# Patient Record
Sex: FEMALE | Race: WHITE | Employment: UNEMPLOYED | URBAN - METROPOLITAN AREA
[De-identification: names, ages, dates, MRNs, and addresses within clinical notes are randomized per-mention and may not be internally consistent; named-entity substitution may affect disease eponyms.]

---

## 2022-02-28 ENCOUNTER — TELEPHONE (OUTPATIENT)
Dept: OBGYN CLINIC | Age: 27
End: 2022-02-28

## 2022-02-28 DIAGNOSIS — Z34.92 ENCOUNTER FOR SUPERVISION OF NORMAL PREGNANCY IN SECOND TRIMESTER, UNSPECIFIED GRAVIDITY: Primary | ICD-10-CM

## 2022-02-28 NOTE — TELEPHONE ENCOUNTER
Hiwot from [address] [place] [address] calling needing orders placed for this patient. Submitted orders.

## 2022-02-28 NOTE — TELEPHONE ENCOUNTER
Call received at 655am    32year old patient to be seen in the office as ob transfer from [facility]     Patient reports edc of 5/27/2022 and that she is A - and needs rhogam prior to her first visit as new ob transfer on 3/15/2022. Patient is 26w6d pregnant     Patient has requested her records ( [place] ) and they are telling her that it will take 30 days. Patient is wondering how to proceed    ?  Lab visit only to check antibody screen and CBC      Please advise how to proceed

## 2022-02-28 NOTE — TELEPHONE ENCOUNTER
Yep. Have her come in for a CBC, Type and RH and ABS. She could also do her Glucola that day too, or wait until her appt for the Glucola. Should be fine to wait until 3/15 for her Rhogam as we typically do it around 28-29  wks.    Toshia Herrmann

## 2022-02-28 NOTE — TELEPHONE ENCOUNTER
Patient advised of CNM recommendations and verbalized understanding        Patient will wait for the Glucola testing    Patient was placed on the schedule to be seen today at 2:00PM for lab only visit    Patient verbalized understanding.

## 2022-03-01 PROBLEM — Z34.90 PREGNANCY: Status: ACTIVE | Noted: 2022-03-01

## 2022-03-01 LAB
ABO GROUP BLD: NORMAL
BASOPHILS # BLD AUTO: 0 X10E3/UL (ref 0–0.2)
BASOPHILS NFR BLD AUTO: 0 %
EOSINOPHIL # BLD AUTO: 0.1 X10E3/UL (ref 0–0.4)
EOSINOPHIL NFR BLD AUTO: 1 %
ERYTHROCYTE [DISTWIDTH] IN BLOOD BY AUTOMATED COUNT: 12.3 % (ref 11.7–15.4)
HCT VFR BLD AUTO: 36.3 % (ref 34–46.6)
HGB BLD-MCNC: 12 G/DL (ref 11.1–15.9)
IMM GRANULOCYTES # BLD AUTO: 0.1 X10E3/UL (ref 0–0.1)
IMM GRANULOCYTES NFR BLD AUTO: 1 %
LYMPHOCYTES # BLD AUTO: 2.4 X10E3/UL (ref 0.7–3.1)
LYMPHOCYTES NFR BLD AUTO: 17 %
MCH RBC QN AUTO: 29.3 PG (ref 26.6–33)
MCHC RBC AUTO-ENTMCNC: 33.1 G/DL (ref 31.5–35.7)
MCV RBC AUTO: 89 FL (ref 79–97)
MONOCYTES # BLD AUTO: 0.9 X10E3/UL (ref 0.1–0.9)
MONOCYTES NFR BLD AUTO: 7 %
NEUTROPHILS # BLD AUTO: 10.4 X10E3/UL (ref 1.4–7)
NEUTROPHILS NFR BLD AUTO: 74 %
PLATELET # BLD AUTO: 300 X10E3/UL (ref 150–450)
RBC # BLD AUTO: 4.1 X10E6/UL (ref 3.77–5.28)
RH BLD: NEGATIVE
WBC # BLD AUTO: 13.9 X10E3/UL (ref 3.4–10.8)

## 2022-03-15 ENCOUNTER — INITIAL PRENATAL (OUTPATIENT)
Dept: OBGYN CLINIC | Age: 27
End: 2022-03-15

## 2022-03-15 VITALS
DIASTOLIC BLOOD PRESSURE: 64 MMHG | HEIGHT: 65 IN | WEIGHT: 183 LBS | BODY MASS INDEX: 30.49 KG/M2 | SYSTOLIC BLOOD PRESSURE: 120 MMHG

## 2022-03-15 DIAGNOSIS — O26.893 RH NEGATIVE STATUS DURING PREGNANCY IN THIRD TRIMESTER: Primary | ICD-10-CM

## 2022-03-15 DIAGNOSIS — Z67.91 RH NEGATIVE STATUS DURING PREGNANCY IN THIRD TRIMESTER: Primary | ICD-10-CM

## 2022-03-15 DIAGNOSIS — Z29.13 NEED FOR RHOGAM DUE TO RH NEGATIVE MOTHER: ICD-10-CM

## 2022-03-15 DIAGNOSIS — Z34.90 ENCOUNTER FOR SUPERVISION OF NORMAL PREGNANCY, ANTEPARTUM, UNSPECIFIED GRAVIDITY: ICD-10-CM

## 2022-03-15 LAB
ANTIBODY SCREEN, EXTERNAL: NEGATIVE
BLOOD BANK CMNT PATIENT-IMP: NORMAL
BLOOD GROUP ANTIBODIES SERPL: NORMAL
COMMENT, HOLDF: NORMAL
HIV, EXTERNAL: NORMAL
SAMPLES BEING HELD,HOLD: NORMAL
T. PALLIDUM, EXTERNAL: NORMAL

## 2022-03-15 PROCEDURE — 0502F SUBSEQUENT PRENATAL CARE: CPT | Performed by: ADVANCED PRACTICE MIDWIFE

## 2022-03-15 PROCEDURE — 96372 THER/PROPH/DIAG INJ SC/IM: CPT | Performed by: ADVANCED PRACTICE MIDWIFE

## 2022-03-15 PROCEDURE — 90384 RH IG FULL-DOSE IM: CPT | Performed by: ADVANCED PRACTICE MIDWIFE

## 2022-03-15 RX ORDER — HUMAN RHO(D) IMMUNE GLOBULIN 300 UG/1
300 INJECTION, SOLUTION INTRAMUSCULAR ONCE
Qty: 1 EACH | Refills: 0
Start: 2022-03-15 | End: 2022-03-15

## 2022-03-15 NOTE — PROGRESS NOTES
After obtaining consent, and per orders of Everett MARTINEZ, injection of Rhogam Ultra-filtered Plus given in right gluteus by Jose D Varela LPN. Patient instructed to remain in clinic for 20 minutes afterwards, and to report any adverse reaction to me immediately. Lot: [id number] Exp: 12/12/2022 NDC: [id number].

## 2022-03-15 NOTE — PATIENT INSTRUCTIONS
Weeks 26 to 30 of Your Pregnancy: Care Instructions  Overview     You are now entering your last trimester of pregnancy. Your baby is growing quickly. Melva Laci probably feel your baby moving around more often. Your doctor may ask you to count your baby's kicks. Your back may ache as your body gets used to your baby's size and length. If you haven't already had the Tdap shot during this pregnancy, talk to your doctor about getting it. It will help protect your  against pertussis infection. During this time, it's important to take care of yourself and pay attention to what your body needs. If you feel sexual, you can explore ways to be close with your partner that match your comfort and desire. Follow-up care is a key part of your treatment and safety. Be sure to make and go to all appointments, and call your doctor if you are having problems. It's also a good idea to know your test results and keep a list of the medicines you take. How can you care for yourself at home? Take it easy at work  · Take frequent breaks. If possible, stop working when you are tired, and rest during your lunch hour. · Take bathroom breaks every 2 hours. · Change positions often. If you sit for long periods, stand up and walk around. · When you stand for a long time, keep one foot on a low stool with your knee bent. After standing a lot, sit with your feet up. · Avoid fumes, chemicals, and tobacco smoke. Be sexual in your own way  · Having sex during pregnancy is okay, unless your doctor tells you not to. · You may be very interested in sex, or you may have no interest at all. · Your growing belly can make it hard to find a good position during intercourse. [place] and explore. · You may get cramps in your uterus when your partner touches your breasts. · A back rub may relieve the backache or cramps that sometimes follow orgasm. Learn about  labor  · Watch for signs of  labor.  You may be going into labor if:  ? You have menstrual-like cramps, with or without nausea. ? You have about 6 or more contractions in 1 hour, even after you have had a glass of water and are resting. ? You have a low, dull backache that does not go away when you change your position. ? You have pain or pressure in your pelvis that comes and goes in a pattern. ? You have intestinal cramping or flu-like symptoms, with or without diarrhea.  ? You notice an increase or change in your vaginal discharge. Discharge may be heavy, mucus-like, watery, or streaked with blood. ? Your water breaks. · If you think you have  labor:  ? Drink 2 or 3 glasses of water or juice. Not drinking enough fluids can cause contractions. ? Stop what you are doing, and empty your bladder. Then lie down on your left side for at least 1 hour. ? While lying on your side, find your breast bone. Put your fingers in the soft spot just below it. Move your fingers down toward your belly button to find the top of your uterus. Check to see if it is tight. ? Contractions can be weak or strong. Record your contractions for an hour. Time a contraction from the start of one contraction to the start of the next one.  ? Single or several strong contractions without a pattern are called Hays-Chun contractions. They are practice contractions but not the start of labor. They often stop if you change what you are doing. ? Call your doctor if you have regular contractions. Where can you learn more? Go to http://www.gray.com/  Enter K794 in the search box to learn more about \"Weeks 26 to 30 of Your Pregnancy: Care Instructions. \"  Current as of: 2021               Content Version: 13.2  © 9778-1355 [employer]. Care instructions adapted under license by [employer] (which disclaims liability or warranty for this information).  If you have questions about a medical condition or this instruction, always ask your healthcare professional. [address] any warranty or liability for your use of this information. Learning About Rh Immunoglobulin Shots  Introduction     An Rh immunoglobulin shot is given to pregnant women who have Rh-negative blood. You may have Rh-negative blood, and your baby may have Rh-positive blood. If the two types of blood mix, your body will make antibodies. This is called Rh sensitization. Most of the time, this is not a problem the first time you're pregnant. But it could cause problems in future pregnancies. This shot keeps your body from making the antibodies. You get the shot around 28 weeks of pregnancy. After the birth, your baby's blood is tested. If the blood is Rh positive, you will get another shot. You may also get the shot if you have vaginal bleeding while you are pregnant or if you have a miscarriage. These shots protect future pregnancies. Women with Rh negative blood will need this shot each time they get pregnant. Example  · Rh immunoglobulin (HypRho-D, MICRhoGAM, and RhoGAM)  Possible side effects  Rare side effects may include:  · Some mild pain where you got the shot. · A slight fever. · An allergic reaction. You may have other side effects not listed here. Check the information that comes with your medicine. What to know about taking this medicine  · You may need more than one shot. You may need the shot again:  ? After amniocentesis, fetal blood sampling, or chorionic villus sampling tests. ? If you have bleeding in your second or third trimester. ? After turning of a breech baby. ? After an injury to the belly while you are pregnant. ? After a miscarriage or an . ? Before or right after treatment for an ectopic or a partial molar pregnancy.   · Tell your doctor if you have any allergies or have had a bad response to medicines in the past.  · If you get this shot within 3 months of getting a live-virus vaccine, the vaccine may not work. Your doctor will tell you if you need more vaccine. · Check with your doctor or pharmacist before you use any other medicines. This includes over-the-counter medicines. Make sure your doctor knows all of the medicines, vitamins, herbs, and supplements you take. Taking some medicines at the same time can cause problems. Where can you learn more? Go to http://www.gray.com/  Enter V615 in the search box to learn more about \"Learning About Rh Immunoglobulin Shots. \"  Current as of: June 16, 2021               Content Version: 13.2  © 5190-0978 [employer]. Care instructions adapted under license by [employer] (which disclaims liability or warranty for this information). If you have questions about a medical condition or this instruction, always ask your healthcare professional. [address] any warranty or liability for your use of this information.

## 2022-03-15 NOTE — PROGRESS NOTES
Current pregnancy history:    Henri Miranda is a 32 y.o. female who presents for the transfer of care in pregnancy. She was in [place] with her spouse for his [address] and are now back. Patient's last menstrual period was 2021 (approximate). LMP history:  The date of her LMP is fairly certain. Her menstrual cycles are regular and occur approximately every 28 days and range from 3 to 5 days. The last menses did last the usual number of days. She was not on the pill at conception. She has an established [facility] from [facility] of 22     Ultrasound data:  She had an ultrasound done by the ultrasound tech today which revealed a viable early pregnancy with a EDC of 22. Ultrasound details:    LIMITED OB SCAN  A SINGLE VERTEX 29W4D IUP IS SEEN. FETAL CARDIAC MOTION OBSERVED. LIMITED ANATOMY WAS VISUALIZED AND APPEARS WNL. F/U ANATOMY OF CHOROID PLEXUS CYST, APPEARS  WNL ON TODAYS ULTRASOUND.  EFW= 3 LB 5 OZ ( 58 %)  DUNG= 17.2 CM  PLACENTA APPEAR WITHIN NORMAL LIMITS. Pregnancy symptoms:    Since her LMP she has experienced  urinary frequency, breast tenderness, fatigue and nausea. She has not been vomiting over the last few weeks. Associated signs and symptoms which she denies: dysuria, discharge, vaginal bleeding. She states she has gained weight appropriately    Relevant past pregnancy history:  She has the following pregnancy history:  x1, SAB x1  She has no history of  delivery. Relevant past medical history:(relevant to this pregnancy): noncontributory. Pap/Occupational history:  Last pap smear: 2019  Results: Negative (this was her first Pap)     Her occupation is: [employer]. Substance history:  Negative for alcohol, tobacco and street drugs. Positive for nothing. Exposure history: There is/are no indoor cat/s in the home. The patient was instructed to not change the cat litter. She admits close contact with children on a regular basis.    She has had chicken pox or the vaccine in the past.   Patient denies issues with domestic violence. Genetic Screening/Teratology Counseling: (Includes patient, baby's father, or anyone in either family with:)  3.  Patient's age >/= 28 at [facility]?-- no  .   2. Thalassemia ([facility], [place], [address], or  background): MCV<80?--no.     3.  Neural tube defect (meningomyelocele, spina bifida, anencephaly)?--no.   4.  Congenital heart defect?--no.  5.  Down syndrome?--no.   6.  Eliezer-Sachs ([employer], [place])?--no.   7.  Canavan's Disease?--no.   8.  Familial Dysautonomia?--no.   9.  Sickle cell disease or trait ()? --no   The patient has not been tested for sickle trait  10. Hemophilia or other blood disorders?--no. 11.  Muscular dystrophy?--no. 12.  Cystic fibrosis?--no. 13.  Coleman's Chorea?--no. 14.  Mental retardation/autism (if yes was person tested for Fragile X)?--no. 15.  Other inherited genetic or chromosomal disorder?--no. 12.  Maternal metabolic disorder (DM, PKU, etc)?--no. 17.  Patient or FOB with a child with a birth defect not listed above?--no.  17a. Patient or FOB with a birth defect themselves?--no. 18.  Recurrent pregnancy loss, or stillbirth?--no. 19.  Any medications since LMP other than prenatal vitamins (include vitamins, supplements, OTC meds, drugs, alcohol)?--no. 20.  Any other genetic/environmental exposure to discuss?--no. Infection History:  1. Lives with someone with TB or TB exposed?--no.   2.  Patient or partner has history of genital herpes?--no.  3.  Rash or viral illness since LMP?--no.    4.  History of STD (GC, CT, HPV, syphilis, HIV)? --no   5. Other: OTHER?       Past Medical History:   Diagnosis Date    Lyme disease      Past Surgical History:   Procedure Laterality Date    HX WISDOM TEETH EXTRACTION       Social History     Occupational History    Not on file   Tobacco Use    Smoking status: Never Smoker    Smokeless tobacco: Never Used Substance and Sexual Activity    Alcohol use: No    Drug use: No    Sexual activity: Yes     Partners: Male     Family History   Problem Relation Age of Onset    No Known Problems Mother     No Known Problems Father        Allergies   Allergen Reactions    Latex Hives    [place] Hives     Prior to Admission medications    Medication Sig Start Date End Date Taking? Authorizing Provider   prenatal vit no.124/iron/folic (PRENATAL VITAMIN PO) Take  by mouth.    Yes Provider, Historical        Review of Systems: History obtained from the patient  Constitutional: negative for weight loss, fever, night sweats  HEENT: negative for hearing loss, earache, congestion, snoring, sorethroat  CV: negative for chest pain, palpitations, edema  Resp: negative for cough, shortness of breath, wheezing  Breast: negative for breast lumps, nipple discharge, galactorrhea  GI: negative for change in bowel habits, abdominal pain, black or bloody stools  : negative for frequency, dysuria, hematuria, vaginal discharge  MSK: negative for back pain, joint pain, muscle pain  Skin: negative for itching, rash, hives  Neuro: negative for dizziness, headache, confusion, weakness  Psych: negative for anxiety, depression, change in mood  Heme/lymph: negative for bleeding, bruising, pallor    Objective:  Visit Vitals  /64   Ht 5' 5\" (1.651 m)   Wt 183 lb (83 kg)   LMP 08/24/2021 (Approximate)   Breastfeeding No   BMI 30.45 kg/m²       Physical Exam:   PHYSICAL EXAMINATION    Constitutional  · Appearance: well-nourished, well developed, alert, in no acute distress    HENT  · Head  · Face: appears normal  · Eyes: appear normal  · Ears: normal  · Mouth: normal  · Lips: no lesions    Neck  · Inspection/Palpation: normal appearance    Chest  · Respiratory Effort: breathing unlabored  ·     Cardiovascular  · Heart:  · Auscultation: regular rate     Skin  · General Inspection: no rash, no lesions identified    Neurologic/Psychiatric  · Mental Status:  · Orientation: grossly oriented to person, place and time  · Mood and Affect: mood normal, affect appropriate    Assessment:   Intrauterine pregnancy with the following problems identified:     Transfer of care from [facility] - awaiting records   Scan today for choroid plexus cyst resolved     Had covid in  - growth scan in 4 weeks         Plan:     Offered CF testing, CVS, Nuchal Translucency, MSAFP, amnio, and discussed NIPT  Course of pregnancy discussed including visit schedule, routine U/S, glucola testing, etc.  Avoid alcoholic beverages and illicit/recreational drugs use  Take prenatal vitamins or folic acid daily. Hospital and practice style discussed with coverage system. Discussed nutrition, toxoplasmosis precautions, sexual activity, exercise, need for influenza vaccine, environmental and work hazards, travel advice, screen for domestic violence, need for seat belts. Discussed seafood, unpasteurized dairy products, deli meat, artificial sweeteners, and caffeine. Information on prenatal classes/breastfeeding given. Information on circumcision given  Patient encouraged not to smoke. Discussed current prescription drug use. Given medication list.  Discussed the use of over the counter medications and chemicals. Route of delivery discussed, including risks, benefits, and alternatives of  versus repeat LTCS. Pt understands risk of hemorrhage during pregnancy and post delivery and would accept blood products if necessary in life-threatening emergencies      Handouts given to pt.     Adrianna Washington CNM

## 2022-03-16 LAB
ERYTHROCYTE [DISTWIDTH] IN BLOOD BY AUTOMATED COUNT: 13.1 % (ref 11.5–14.5)
GLUCOSE 1H P 100 G GLC PO SERPL-MCNC: 136 MG/DL (ref 65–140)
HCT VFR BLD AUTO: 36.3 % (ref 35–47)
HGB BLD-MCNC: 11.4 G/DL (ref 11.5–16)
HIV 1+2 AB+HIV1 P24 AG SERPL QL IA: NONREACTIVE
HIV12 RESULT COMMENT, HHIVC: NORMAL
MCH RBC QN AUTO: 29.5 PG (ref 26–34)
MCHC RBC AUTO-ENTMCNC: 31.4 G/DL (ref 30–36.5)
MCV RBC AUTO: 93.8 FL (ref 80–99)
NRBC # BLD: 0 K/UL (ref 0–0.01)
NRBC BLD-RTO: 0 PER 100 WBC
PLATELET # BLD AUTO: 253 K/UL (ref 150–400)
PMV BLD AUTO: 10.9 FL (ref 8.9–12.9)
RBC # BLD AUTO: 3.87 M/UL (ref 3.8–5.2)
WBC # BLD AUTO: 10.3 K/UL (ref 3.6–11)

## 2022-03-17 LAB — T PALLIDUM AB SER QL IA: NON REACTIVE

## 2022-03-18 PROBLEM — Z34.90 PREGNANCY: Status: ACTIVE | Noted: 2022-03-01

## 2022-03-30 NOTE — PATIENT INSTRUCTIONS
Weeks 30 to 32 of Your Pregnancy: Care Instructions  Overview     You've made it to the final months of your pregnancy! By now your baby is really starting to look like a baby, with hair and plump skin. As you enter the final weeks of pregnancy, the reality of having a baby may start to set in. This is a good time to set up a safe nursery and find quality  if needed. Doing this stuff ahead of time will allow you to focus on caring for and enjoying your new baby. You may also want to take a tour of your hospital's labor and delivery unit. This will help you get a better idea of what to expect while you're in the hospital.  During these last months, be sure to take good care of yourself. Pay attention to what your body needs. If your doctor says it's okay for you to work, don't push yourself too hard. If you haven't already had the Tdap shot during this pregnancy, talk to your doctor about getting it. It will help protect your  against pertussis infection. Follow-up care is a key part of your treatment and safety. Be sure to make and go to all appointments, and call your doctor if you are having problems. It's also a good idea to know your test results and keep a list of the medicines you take. How can you care for yourself at home? Pay attention to your baby's movements  · You should feel your baby move several times every day. · Your baby now turns less, and kicks and jabs more. · Your baby sleeps 20 to 45 minutes at a time and is more active at certain times of day. · If your doctor wants you to count your baby's kicks:  ? Empty your bladder, and lie on your side or relax in a comfortable chair. ? Write down your start time. ? Pay attention only to your baby's movements. Count any movement except hiccups. ? After you have counted 10 movements, write down your stop time. ? Write down how many minutes it took for your baby to move 10 times.   ? If an hour goes by and you have not recorded 10 movements, have something to eat or drink and then count for another hour. If you don't record at least 10 movements in the 2-hour period, call your doctor. Ease heartburn  · Eat small, frequent meals. · Do not eat chocolate, peppermint, or very spicy foods. Avoid drinks with caffeine, such as coffee, tea, and sodas. · Avoid bending over or lying down after meals. · Take a short walk after you eat. · If heartburn is a problem at night, do not eat for 2 hours before bedtime. · Take antacids like Mylanta, Maalox, Rolaids, or Tums. Do not take antacids that have sodium bicarbonate. Care for varicose veins  · Varicose veins are blood vessels that stretch out with the extra blood during pregnancy. Your legs may ache or throb. Most varicose veins will go away after the birth. · Avoid standing for long periods of time. Sit with your legs crossed at the ankles, not the knees. · Sit with your feet propped up. · Avoid tight clothing or stockings. Wear support hose. · Exercise regularly. Try walking for at least 30 minutes a day. Where can you learn more? Go to http://www.gray.com/  Enter X471 in the search box to learn more about \"Weeks 30 to 32 of Your Pregnancy: Care Instructions. \"  Current as of: June 16, 2021               Content Version: 13.2  © 3193-3727 [employer]. Care instructions adapted under license by [employer] (which disclaims liability or warranty for this information). If you have questions about a medical condition or this instruction, always ask your healthcare professional. [place] [address] any warranty or liability for your use of this information.

## 2022-03-31 ENCOUNTER — ROUTINE PRENATAL (OUTPATIENT)
Dept: OBGYN CLINIC | Age: 27
End: 2022-03-31
Payer: OTHER GOVERNMENT

## 2022-03-31 VITALS
HEIGHT: 65 IN | BODY MASS INDEX: 30.82 KG/M2 | DIASTOLIC BLOOD PRESSURE: 70 MMHG | WEIGHT: 185 LBS | SYSTOLIC BLOOD PRESSURE: 118 MMHG

## 2022-03-31 DIAGNOSIS — Z34.92 ENCOUNTER FOR SUPERVISION OF NORMAL PREGNANCY IN SECOND TRIMESTER, UNSPECIFIED GRAVIDITY: ICD-10-CM

## 2022-03-31 DIAGNOSIS — Z3A.31 31 WEEKS GESTATION OF PREGNANCY: ICD-10-CM

## 2022-03-31 LAB
ERYTHROCYTE [DISTWIDTH] IN BLOOD BY AUTOMATED COUNT: 12.9 % (ref 11.5–14.5)
HCT VFR BLD AUTO: 34.8 % (ref 35–47)
HGB BLD-MCNC: 11.2 G/DL (ref 11.5–16)
MCH RBC QN AUTO: 28.6 PG (ref 26–34)
MCHC RBC AUTO-ENTMCNC: 32.2 G/DL (ref 30–36.5)
MCV RBC AUTO: 88.8 FL (ref 80–99)
NRBC # BLD: 0 K/UL (ref 0–0.01)
NRBC BLD-RTO: 0 PER 100 WBC
PLATELET # BLD AUTO: 272 K/UL (ref 150–400)
PMV BLD AUTO: 11.1 FL (ref 8.9–12.9)
RBC # BLD AUTO: 3.92 M/UL (ref 3.8–5.2)
WBC # BLD AUTO: 13.1 K/UL (ref 3.6–11)

## 2022-03-31 PROCEDURE — 0502F SUBSEQUENT PRENATAL CARE: CPT | Performed by: ADVANCED PRACTICE MIDWIFE

## 2022-03-31 NOTE — PROGRESS NOTES
Doing well. No c/c. Reports GFM    I agree with Sanya Ibarra note. Kady Dias.  SYMONE Cooper/MICHELLE

## 2022-03-31 NOTE — PROGRESS NOTES
PNC at [address] Doing well. FM+, Denies LOF/VB/cxns. CBC today. Pt feeling fatigued concerned it may be anemia or Lyme's flare up. Needs TDAP.   RTO 2 weeks    [address]

## 2022-04-14 ENCOUNTER — ROUTINE PRENATAL (OUTPATIENT)
Dept: OBGYN CLINIC | Age: 27
End: 2022-04-14

## 2022-04-14 VITALS
BODY MASS INDEX: 31.16 KG/M2 | HEIGHT: 65 IN | DIASTOLIC BLOOD PRESSURE: 64 MMHG | WEIGHT: 187 LBS | SYSTOLIC BLOOD PRESSURE: 108 MMHG

## 2022-04-14 DIAGNOSIS — Z3A.34 34 WEEKS GESTATION OF PREGNANCY: ICD-10-CM

## 2022-04-14 PROCEDURE — 0502F SUBSEQUENT PRENATAL CARE: CPT | Performed by: ADVANCED PRACTICE MIDWIFE

## 2022-04-14 NOTE — PROGRESS NOTES
Doing well. Reports GFM  No questions today- still unsure if  will be present at [facility] - pt grandmother is unwell and her mother is her health care power of  - a little stressful not knowing who will be at her birth   GBS next visit   LIMITED OB SCAN  A SINGLE VERTEX 33W6D IUP IS SEEN. FETAL CARDIAC MOTION OBSERVED. LIMITED ANATOMY WAS VISUALIZED AND APPEARS WNL. EFW= 6 LB 14 OZ ( 90.6 %)  DUNG= 18.63 CM  PLACENTA APPEAR WITHIN NORMAL LIMITS.

## 2022-04-28 ENCOUNTER — ROUTINE PRENATAL (OUTPATIENT)
Dept: OBGYN CLINIC | Age: 27
End: 2022-04-28
Payer: OTHER GOVERNMENT

## 2022-04-28 VITALS — WEIGHT: 192 LBS | SYSTOLIC BLOOD PRESSURE: 105 MMHG | BODY MASS INDEX: 31.95 KG/M2 | DIASTOLIC BLOOD PRESSURE: 62 MMHG

## 2022-04-28 DIAGNOSIS — Z3A.34 34 WEEKS GESTATION OF PREGNANCY: ICD-10-CM

## 2022-04-28 DIAGNOSIS — Z34.90 ENCOUNTER FOR SUPERVISION OF NORMAL PREGNANCY, ANTEPARTUM, UNSPECIFIED GRAVIDITY: Primary | ICD-10-CM

## 2022-04-28 LAB — GRBS, EXTERNAL: NEGATIVE

## 2022-04-28 PROCEDURE — 59425 ANTEPARTUM CARE ONLY: CPT | Performed by: MIDWIFE

## 2022-04-28 PROCEDURE — 0502F SUBSEQUENT PRENATAL CARE: CPT | Performed by: MIDWIFE

## 2022-04-28 NOTE — PATIENT INSTRUCTIONS
Weeks 34 to 36 of Your Pregnancy: Care Instructions  Overview     By now, your baby and your belly have grown quite large. It's almost time to give birth! Your baby's lungs are almost ready to breathe air. The skull bones are firm enough to protect your baby's head, but soft enough to move down through the birth canal.  You may be feeling excited and happy at times--but also anxious or scared. You might wonder how you'll know if you're in labor or what to expect during labor. Try to be open and flexible in your expectations of the birth. Because each birth is different, there's no way to know exactly what childbirth will be like for you. Talk to your doctor or midwife about any concerns you have. If you haven't already had the Tdap shot during this pregnancy, talk to your doctor about getting it. It will help protect your  against pertussis infection. In the 36th week, you'll probably have a test for group B streptococcus (GBS). GBS is a common type of bacteria that can live in the vagina and rectum. It can make your baby sick after birth. If you test positive, you will get antibiotics during labor. The medicine will help keep your baby from getting the bacteria. Follow-up care is a key part of your treatment and safety. Be sure to make and go to all appointments, and call your doctor if you are having problems. It's also a good idea to know your test results and keep a list of the medicines you take. How can you care for yourself at home? Learn about pain relief choices  · Pain is different for everyone. Talk with your doctor about your feelings about pain. · You can choose from several types of pain relief. These include medicine, breathing techniques, and comfort measures. You can use more than one option. · If you choose to have pain medicine during labor, talk to your doctor about your options. Some medicines lower anxiety and help with some of the pain.  Others make your lower body numb so that you won't feel pain. · Be sure to tell your doctor about your pain medicine choice before you start labor or very early in your labor. You may be able to change your mind as labor progresses. Labor and delivery  · The first stage of labor has three parts: early, active, and transition. ? It's common to have early labor at home. You can stay busy or rest, eat light snacks, drink clear fluids, and start counting contractions. ? When talking during a contraction gets hard, you may be moving to active labor. During active labor, you should head for the hospital if you aren't there already. ? You are in active labor when contractions come every 3 to 4 minutes and last about 60 seconds. Your cervix is opening more rapidly. ? If your water breaks, contractions will come faster and stronger. ? During transition, your cervix is stretching, and contractions are coming more rapidly. ? You may want to push, but your cervix might not be ready. Your doctor will tell you when to push. · The second stage starts when your cervix is completely opened and you are ready to push. ? Contractions are very strong to push the baby down the birth canal.  ? You will probably feel the urge to push. You may feel like you need to have a bowel movement. ? You may be coached to push with contractions. These contractions will be very strong, but you won't have them as often. You can get a little rest between contractions. ? One last push, and your baby is born. · The third stage is when a few more contractions push out the placenta. This may take 30 minutes or less. Where can you learn more? Go to http://www.gray.com/  Enter B912 in the search box to learn more about \"Weeks 34 to 36 of Your Pregnancy: Care Instructions. \"  Current as of: June 16, 2021               Content Version: 13.2  © 7234-4043 [employer].    Care instructions adapted under license by [employer] (which disclaims liability or warranty for this information). If you have questions about a medical condition or this instruction, always ask your healthcare professional. [address] any warranty or liability for your use of this information.

## 2022-04-28 NOTE — PROGRESS NOTES
Patient is doing well  No issues or complaints  GBS today  Expects  home from [place] May 14 in time for birth  Traumatic experience first time in [facility]  [facility] 1 week.   US, BPP next visit (covid in January)

## 2022-05-03 LAB
BACTERIA SPEC CULT: NORMAL
SERVICE CMNT-IMP: NORMAL

## 2022-05-05 ENCOUNTER — ROUTINE PRENATAL (OUTPATIENT)
Dept: OBGYN CLINIC | Age: 27
End: 2022-05-05

## 2022-05-05 VITALS
SYSTOLIC BLOOD PRESSURE: 116 MMHG | BODY MASS INDEX: 32.15 KG/M2 | WEIGHT: 193 LBS | HEIGHT: 65 IN | DIASTOLIC BLOOD PRESSURE: 66 MMHG

## 2022-05-05 DIAGNOSIS — Z34.83 PRENATAL CARE, SUBSEQUENT PREGNANCY IN THIRD TRIMESTER: Primary | ICD-10-CM

## 2022-05-05 DIAGNOSIS — Z3A.36 36 WEEKS GESTATION OF PREGNANCY: ICD-10-CM

## 2022-05-05 PROCEDURE — 0502F SUBSEQUENT PRENATAL CARE: CPT | Performed by: ADVANCED PRACTICE MIDWIFE

## 2022-05-05 NOTE — PROGRESS NOTES
Doing well. No c/c. Reports GFM  Reviewed SOL, [address], labor prep as pt had a bad experience last time and wouldlike to have an unmedicated birth if possible  LIMITED OB SCAN  A SINGLE VERTEX 36W6D IUP IS SEEN. FETAL CARDIAC MOTION OBSERVED. LIMITED ANATOMY WAS VISUALIZED AND APPEARS WNL. EFW= 6 LB 8 OZ ( 44.7 %)  DUNG= 19.04 CM  PLACENTA APPEARS GRADE II.

## 2022-05-12 ENCOUNTER — ROUTINE PRENATAL (OUTPATIENT)
Dept: OBGYN CLINIC | Age: 27
End: 2022-05-12

## 2022-05-12 VITALS — BODY MASS INDEX: 32.12 KG/M2 | WEIGHT: 193 LBS | SYSTOLIC BLOOD PRESSURE: 118 MMHG | DIASTOLIC BLOOD PRESSURE: 78 MMHG

## 2022-05-12 DIAGNOSIS — Z3A.36 36 WEEKS GESTATION OF PREGNANCY: ICD-10-CM

## 2022-05-12 NOTE — PATIENT INSTRUCTIONS
Week 40 of Your Pregnancy: Care Instructions  Overview     You are near the end of your pregnancy--and you're probably pretty uncomfortable. It may be harder to walk around. Lying down probably isn't comfortable either. You may have trouble getting to sleep or staying asleep. Most babies are born between 40 and 41 weeks. This is a good time to think about packing a bag for the hospital with items you'll need. Then you'll be ready when labor starts. Follow-up care is a key part of your treatment and safety. Be sure to make and go to all appointments, and call your doctor if you are having problems. It's also a good idea to know your test results and keep a list of the medicines you take. How can you care for yourself at home? Learn about breastfeeding  · Breastfeeding is best for your baby and good for you. · Breast milk has antibodies to help your baby fight infections. · If you breastfeed, you may lose weight faster. That's because making milk burns calories. · Learning the best ways to hold your baby will make breastfeeding easier. · Sometimes breastfeeding can make partners feel left out. If you have a partner, plan how you can care for your baby together. For example, your partner can bathe and diaper the baby. You can snuggle together when you breastfeed. · You may want to learn how to use a breast pump and store your milk. · If you choose to bottle feed, make the feeding feel like breastfeeding so you can bond with your baby. Always hold your baby and the bottle. Don't prop bottles or let your baby fall asleep with a bottle. Learn about crying  · It's common for babies to cry for 1 to 3 hours a day. Some cry more, and some cry less. · Babies don't cry to make you upset or because you're a bad parent. · Crying is how your baby communicates. Your baby may be hungry; have gas; need a diaper change; or feel cold, warm, tired, lonely, or tense. Sometimes babies cry for unknown reasons.   · If you respond to your baby's needs, your baby will learn to trust you. · Try to stay calm when your baby cries. Your baby may get more upset if they sense that you are upset. Know how to care for your   · Your baby's umbilical cord stump will drop off on its own, usually between 1 and 2 weeks. To care for your baby's umbilical cord area:  ? Clean the area at the bottom of the cord 2 or 3 times a day. ? Pay special attention to the area where the cord attaches to the skin. ? Keep the diaper folded below the cord. ? Use a damp washcloth or cotton ball to sponge bathe your baby until the stump has come off. · Your baby's first dark stool is called meconium. After the meconium is passed, your baby will develop their own bowel pattern. ? Some babies, especially  babies, have several bowel movements a day. Others have one or two a day, or one every 2 to 3 days. ?  babies often have loose, yellow stools. Formula-fed babies have more formed stools. ? If your baby's stools look like little pellets, your baby is constipated. After 2 days of constipation, call your baby's doctor. · If your baby will be circumcised, you can care for your baby at home. ? Gently rinse your baby's penis with warm water after every diaper change. Don't try to remove the film that forms on the penis. This film will go away on its own. Pat dry. ? Put petroleum ointment, such as Vaseline, on the area of the diaper that will touch your baby's penis. This will keep the diaper from sticking to your baby. ? Ask the doctor about giving your baby acetaminophen (Tylenol) for pain. Where can you learn more? Go to http://www.gray.com/  Enter N257 in the search box to learn more about \"Week 37 of Your Pregnancy: Care Instructions. \"  Current as of: 2021               Content Version: 13.2  © 7357-4339 [employer].    Care instructions adapted under license by [employer] (which disclaims liability or warranty for this information). If you have questions about a medical condition or this instruction, always ask your healthcare professional. [address] any warranty or liability for your use of this information.

## 2022-05-12 NOTE — PROGRESS NOTES
Patient is doing well  +FM  A little swelling in legs  Had mild contractions a few night back that have subsided  Declines TDAP

## 2022-05-19 ENCOUNTER — ROUTINE PRENATAL (OUTPATIENT)
Dept: OBGYN CLINIC | Age: 27
End: 2022-05-19
Payer: MEDICAID

## 2022-05-19 VITALS
HEIGHT: 65 IN | DIASTOLIC BLOOD PRESSURE: 74 MMHG | WEIGHT: 192 LBS | SYSTOLIC BLOOD PRESSURE: 116 MMHG | BODY MASS INDEX: 31.99 KG/M2

## 2022-05-19 DIAGNOSIS — Z34.83 ENCOUNTER FOR SUPERVISION OF OTHER NORMAL PREGNANCY IN THIRD TRIMESTER: Primary | ICD-10-CM

## 2022-05-19 PROCEDURE — 0502F SUBSEQUENT PRENATAL CARE: CPT | Performed by: MIDWIFE

## 2022-05-19 NOTE — PROGRESS NOTES
Doing well, tired. No SOL. Reports GFM.  is home from [facility], out of the [employer]. Started a job in carpentry. Labor precautions reviewed.   [facility] 1 week

## 2022-05-25 ENCOUNTER — HOSPITAL ENCOUNTER (EMERGENCY)
Age: 27
Discharge: HOME | DRG: 560 | End: 2022-05-25
Payer: MEDICAID

## 2022-05-25 ENCOUNTER — TELEPHONE (OUTPATIENT)
Dept: OBGYN CLINIC | Age: 27
End: 2022-05-25

## 2022-05-25 PROCEDURE — 99282 EMERGENCY DEPT VISIT SF MDM: CPT

## 2022-05-25 NOTE — ED PROVIDER NOTES
[facility] Provider Note    Name: Dawood Barr MRN: [id number]  SSN: [id number]    YOB: 1995  Age: 32 y.o. Sex: female        Subjective:     Estimated Date of Delivery: 22  OB History        3    Para   1    Term   1            AB   1    Living   1       SAB   1    IAB        Ectopic        Molar        Multiple        Live Births   1                Ms. Pryor Daughters presents to [facility] with pregnancy at 39w5d for Increasingly painful contractions. Prenatal course was normal. Please see prenatal records for details. Prenatal Labs:   Lab Results   Component Value Date/Time    GrBStrep, External Negative 2022 12:00 AM        Patient Active Problem List    Diagnosis    Pregnancy     Provider: MICHELLE    NURA by: 22  Pertinents:  Consider BPPs had  COVID in January in pregnancy - fetal growth at 34 weeks 90%  Transfer from [facility]- still unsure if her  will be present for her birth, mother plans to be there but is also her grandmother's medical power of  and she is unwell - [address]    Had a bad experience with her first birth- does not want to be in the bed, keep her moving, happy to have a Hep Lock would prefer intermittent monitoring - epidural with her first baby at 7 cm-   Rh Neg  IOB labs: normal  Genetic Screening:  Anatomy: [facility] on initial christa scan, resolved @ 28 wks  GTT: 315; 136  Flu:  TDAP: Declined  Rhogam: Aneg, Rhogam 3/15  Third Tri Labs: 11.2/34.8 - normal  GBS: Negative  COVID:  +    Pain mgmt. in labor: Desires NCB  Feeding:  Circ:  Social:    is -still in [place] he gets here in time for birth. Has birth plan. Experienced trauma with last birth-daughter went to NICU for meconium aspiration. It's a boy! \"Mango\"         No specialty comments available.   Past Medical History:   Diagnosis Date    Lyme disease      Past Surgical History:   Procedure Laterality Date    HX WISDOM TEETH EXTRACTION Social History     Occupational History    Not on file   Tobacco Use    Smoking status: Never Smoker    Smokeless tobacco: Never Used   Substance and Sexual Activity    Alcohol use: No    Drug use: No    Sexual activity: Yes     Partners: Male     Family History   Problem Relation Age of Onset    No Known Problems Mother     No Known Problems Father        Allergies   Allergen Reactions    Latex Hives    Talat Hives     Prior to Admission medications    Medication Sig Start Date End Date Taking? Authorizing Provider   prenatal vit no.124/iron/folic (PRENATAL VITAMIN PO) Take  by mouth. Provider, Historical        Review of Systems   Constitutional: Negative. HENT: Negative. Eyes: Negative. Respiratory: Negative. Cardiovascular: Negative. Gastrointestinal: Positive for abdominal pain. Genitourinary: Negative. Musculoskeletal: Negative. Skin: Negative. Neurological: Negative. Endo/Heme/Allergies: Negative. Psychiatric/Behavioral: Negative. All other systems reviewed and are negative. Objective:     Vitals: There were no vitals filed for this visit. Physical Exam:  Physical Exam  Patient without distress.   Abdomen: soft, nontender  Fundus: soft and non tender  Perineum: blood absent, amniotic fluid absent  Cervical Exam: 2 cm dilated    50% effaced    -3 station    Presenting Part: cephalic  Cervical Position: posterior  Consistency: Medium  Lower Extremities:  - Edema No  Membranes:  Intact  Fetal Heart Rate: Reactive  Baseline: 145 per minute  Variability: moderate  Accelerations: yes  Decelerations: none  Uterine contractions: irregular, every 3-5 minutes, mild      MDM  Number of Diagnoses or Management Options     Amount and/or Complexity of Data Reviewed  Review and summarize past medical records: yes  Independent visualization of images, tracings, or specimens: yes    Risk of Complications, Morbidity, and/or Mortality  Presenting problems: moderate  Diagnostic procedures: moderate  Management options: moderate        Procedures    NST    Assessment/Plan:     Plan: Pt offered to stay and ambulate for 2 hrs and recheck cervix or be discharged home. She prefers to be discharged home. Try hot shower and Benadryl for sleep. Pt has Routine OB appt.   Tomorrow afternoon with Damon-Larissa  Labor warnings      Signed By:  Coleen Olvera CNM     May 25, 2022

## 2022-05-25 NOTE — TELEPHONE ENCOUNTER
Call received at 2:45Pm      32year old patient last seen in the office on 5/19/2022          Patient is calling to say that she has been having contractions every three minutes since 2am ,denies vaginal bleeding, rom and is feeling the baby move    Patient reports the intensity and not change much through out the day      Patient is about an hour out     CNM advised for patient to come to labor and delivery  if she wants to be checked    Patient states she will wait awhile yet and call back later  Patient verbalized understanding.

## 2022-05-25 NOTE — PROGRESS NOTES
5/25/2022  6:59 PM Patient arrived from home with c/o contractions since 0200. Patient states that the frequency and intensity of the contractions are unchanged all day. Patient reports +FM and denies vaginal bleeding or LOF. [address] at bedside. SVE 2/50/-3. Patient requesting to go home and follow up in the office tomorrow.

## 2022-05-26 ENCOUNTER — ROUTINE PRENATAL (OUTPATIENT)
Dept: OBGYN CLINIC | Age: 27
End: 2022-05-26
Payer: MEDICAID

## 2022-05-26 VITALS — WEIGHT: 191 LBS | BODY MASS INDEX: 31.78 KG/M2 | DIASTOLIC BLOOD PRESSURE: 70 MMHG | SYSTOLIC BLOOD PRESSURE: 126 MMHG

## 2022-05-26 DIAGNOSIS — Z3A.39 39 WEEKS GESTATION OF PREGNANCY: ICD-10-CM

## 2022-05-26 DIAGNOSIS — Z34.83 PRENATAL CARE, SUBSEQUENT PREGNANCY IN THIRD TRIMESTER: Primary | ICD-10-CM

## 2022-05-26 PROCEDURE — 0502F SUBSEQUENT PRENATAL CARE: CPT | Performed by: ADVANCED PRACTICE MIDWIFE

## 2022-05-26 NOTE — DISCHARGE INSTRUCTIONS
You came to the hospital because you thought you were in labor. This information may help you decide when you need to call your provider and return to the hospital.     Am I in Labor? ?  While each woman may feel contractions differently, these facts may help you decide if you are in true labor. A contraction is a painful tightening of the uterus. It may feel like the baby is sitting up, a bad backache or severe menstrual cramps. Sometimes women have contractions that do not cause cervical change- this is often called \"false labor. \"      SIGNS AND SYMPTOMS OF LABOR   Uterine cramping   Diarrhea   Increase or change in vaginal discharge   Low abdominal pressure   Dull low backache   Feeling like your baby is pushing down    When these symptoms are experienced. .. STOP what you are doing, lie on your side, drink two or three glasses of water or juice, and wait one hour. If the symptoms worsen call your care provider. If the symptoms go away, inform your care provider at the next visit that this happened. In TRUE LABOR contractions: In FALSE LABOR contractions:   * Occur regularly every 5 min or less * Occur irregularly   * Feel stronger and hurt more * Stay the same or space out   * Become stronger with walking * Strength stays the same or they hurt less  when walking   * Pinkish mucus or spotting maybe present          Call your provider if:   Regular, painful contractions every 5 minutes or less for one hour. Time your contractions.   You have a gush of fluid from your vagina.  Vaginal bleeding that is bright red, like a period (it is normal to have spotting after a vaginal exam or intercourse).   Your baby is not moving as much as usual- at least 4 fetal movements in 1 hour after drinking and resting on your side for 1 hour.  Fever 101 or above taken orally. Follow Up Appointment:  tomorrow with Ryan at the office.       Medications: Continue prenatal medications and/or any new medications

## 2022-05-26 NOTE — PROGRESS NOTES
Feeling good movement. Is unsure what she would like to do for labor/delivery.   Reviewed options for waiting for labor versus IOL  Recommend waiting for labor- using last night contractions as the baseline for how contractions feel  [facility] 1 week post dates US with apt after

## 2022-05-26 NOTE — PATIENT INSTRUCTIONS
Week 39 of Your Pregnancy: Care Instructions  Your Care Instructions     During these final weeks, you may feel anxious to see your new baby. [place] babies often look different from what you see in pictures or movies. Right after birth, their heads may have a strange shape. Their eyes may be puffy. And their genitals may be swollen. They may also have very dry skin, or red marks on the eyelids, nose, or neck. Still, most parents think their babies are beautiful. Follow-up care is a key part of your treatment and safety. Be sure to make and go to all appointments, and call your doctor if you are having problems. It's also a good idea to know your test results and keep a list of the medicines you take. How can you care for yourself at home? Prepare to breastfeed  · If you are breastfeeding, continue to eat healthy foods. · If your health care provider recommends it, keep taking your prenatal vitamins. · Talk to your doctor before you take any medicine or supplement. Some medicines can affect your breast milk. · You can help prevent sore nipples if you feed your baby in the correct position. Nurses will help you learn to do this. Choose the right birth control after your baby is born  · If you are breastfeeding, you can still get pregnant. Use birth control if you don't want to get pregnant. · Intrauterine devices (IUDs) are very effective at preventing pregnancy and can provide birth control for 3 to 10 years, depending on the type. If you talk with your doctor before you have your baby, the IUD can be placed right after you give birth. If you decide you want to get pregnant later, you can have it removed. IUDs are safe to use while you are breastfeeding. · A hormonal implant is also very effective at preventing pregnancy. It is placed under the skin of the arm. This can be done right after you give birth. It releases the hormone progestin and prevents pregnancy for about 3 years.  This can also be removed by a doctor at any time. It is safe to use while you are breastfeeding. · Depo-Provera can be used while you are breastfeeding. It's a shot you get every 3 months. · Birth control pills work well. But you need a different kind of pill for the first few weeks after you give birth. And when you start taking these pills, you need to make sure to use another type of birth control for 7 days after you start your pack. · Diaphragms, cervical caps, and condoms with spermicide work less well after birth. If you have a diaphragm or cervical cap, talk to your doctor to see if you need a different size. · Tubal ligation (tying your tubes) and vasectomy are both permanent. These are good options if you are sure you are done having children. Where can you learn more? Go to http://randy-roderick.info/  Enter A811 in the search box to learn more about \"Week 39 of Your Pregnancy: Care Instructions. \"  Current as of: June 16, 2021               Content Version: 13.2  © 2006-2022 [employer]. Care instructions adapted under license by [employer] (which disclaims liability or warranty for this information). If you have questions about a medical condition or this instruction, always ask your healthcare professional. [address] any warranty or liability for your use of this information.

## 2022-05-27 ENCOUNTER — ROUTINE PRENATAL (OUTPATIENT)
Dept: OBGYN CLINIC | Age: 27
End: 2022-05-27
Payer: MEDICAID

## 2022-05-27 ENCOUNTER — TELEPHONE (OUTPATIENT)
Dept: OBGYN CLINIC | Age: 27
End: 2022-05-27

## 2022-05-27 ENCOUNTER — ANESTHESIA (OUTPATIENT)
Dept: LABOR AND DELIVERY | Age: 27
DRG: 560 | End: 2022-05-27
Payer: MEDICAID

## 2022-05-27 ENCOUNTER — ANESTHESIA EVENT (OUTPATIENT)
Dept: LABOR AND DELIVERY | Age: 27
DRG: 560 | End: 2022-05-27
Payer: MEDICAID

## 2022-05-27 ENCOUNTER — HOSPITAL ENCOUNTER (INPATIENT)
Age: 27
LOS: 2 days | Discharge: HOME | DRG: 560 | End: 2022-05-29
Attending: OBSTETRICS & GYNECOLOGY | Admitting: ADVANCED PRACTICE MIDWIFE
Payer: MEDICAID

## 2022-05-27 VITALS — DIASTOLIC BLOOD PRESSURE: 80 MMHG | SYSTOLIC BLOOD PRESSURE: 126 MMHG | BODY MASS INDEX: 31.62 KG/M2 | WEIGHT: 190 LBS

## 2022-05-27 DIAGNOSIS — Z34.83 PRENATAL CARE, SUBSEQUENT PREGNANCY IN THIRD TRIMESTER: Primary | ICD-10-CM

## 2022-05-27 LAB
ERYTHROCYTE [DISTWIDTH] IN BLOOD BY AUTOMATED COUNT: 13.4 % (ref 11.5–14.5)
HCT VFR BLD AUTO: 41.9 % (ref 35–47)
HGB BLD-MCNC: 13.5 G/DL (ref 11.5–16)
MCH RBC QN AUTO: 27.4 PG (ref 26–34)
MCHC RBC AUTO-ENTMCNC: 32.2 G/DL (ref 30–36.5)
MCV RBC AUTO: 85 FL (ref 80–99)
NRBC # BLD: 0 K/UL (ref 0–0.01)
NRBC BLD-RTO: 0 PER 100 WBC
PLATELET # BLD AUTO: 242 K/UL (ref 150–400)
PMV BLD AUTO: 11.4 FL (ref 8.9–12.9)
RBC # BLD AUTO: 4.93 M/UL (ref 3.8–5.2)
TYPE, ABO & RH, EXTERNAL: NORMAL
WBC # BLD AUTO: 10.4 K/UL (ref 3.6–11)

## 2022-05-27 PROCEDURE — 75410000002 HC LABOR FEE PER 1 HR

## 2022-05-27 PROCEDURE — 0HQ9XZZ REPAIR PERINEUM SKIN, EXTERNAL APPROACH: ICD-10-PCS | Performed by: OBSTETRICS & GYNECOLOGY

## 2022-05-27 PROCEDURE — 74011000250 HC RX REV CODE- 250: Performed by: ANESTHESIOLOGY

## 2022-05-27 PROCEDURE — 59025 FETAL NON-STRESS TEST: CPT | Performed by: ADVANCED PRACTICE MIDWIFE

## 2022-05-27 PROCEDURE — 76060000078 HC EPIDURAL ANESTHESIA

## 2022-05-27 PROCEDURE — 74011250636 HC RX REV CODE- 250/636: Performed by: ANESTHESIOLOGY

## 2022-05-27 PROCEDURE — 75410000003 HC RECOV DEL/VAG/CSECN EA 0.5 HR

## 2022-05-27 PROCEDURE — 59425 ANTEPARTUM CARE ONLY: CPT | Performed by: ADVANCED PRACTICE MIDWIFE

## 2022-05-27 PROCEDURE — 0502F SUBSEQUENT PRENATAL CARE: CPT | Performed by: ADVANCED PRACTICE MIDWIFE

## 2022-05-27 PROCEDURE — 36415 COLL VENOUS BLD VENIPUNCTURE: CPT

## 2022-05-27 PROCEDURE — 59410 OBSTETRICAL CARE: CPT | Performed by: ADVANCED PRACTICE MIDWIFE

## 2022-05-27 PROCEDURE — 75410000000 HC DELIVERY VAGINAL/SINGLE

## 2022-05-27 PROCEDURE — 85027 COMPLETE CBC AUTOMATED: CPT

## 2022-05-27 PROCEDURE — 00HU33Z INSERTION OF INFUSION DEVICE INTO SPINAL CANAL, PERCUTANEOUS APPROACH: ICD-10-PCS | Performed by: ANESTHESIOLOGY

## 2022-05-27 PROCEDURE — 65270000029 HC RM PRIVATE

## 2022-05-27 RX ORDER — LIDOCAINE HYDROCHLORIDE AND EPINEPHRINE 15; 5 MG/ML; UG/ML
INJECTION, SOLUTION EPIDURAL
Status: COMPLETED | OUTPATIENT
Start: 2022-05-27 | End: 2022-05-27

## 2022-05-27 RX ORDER — SODIUM CHLORIDE, SODIUM LACTATE, POTASSIUM CHLORIDE, CALCIUM CHLORIDE 600; 310; 30; 20 MG/100ML; MG/100ML; MG/100ML; MG/100ML
125 INJECTION, SOLUTION INTRAVENOUS CONTINUOUS
Status: CANCELLED | OUTPATIENT
Start: 2022-05-27

## 2022-05-27 RX ORDER — FENTANYL/BUPIVACAINE/NS/PF 2-1250MCG
1-16 PREFILLED PUMP RESERVOIR EPIDURAL CONTINUOUS
Status: DISCONTINUED | OUTPATIENT
Start: 2022-05-27 | End: 2022-05-28 | Stop reason: HOSPADM

## 2022-05-27 RX ORDER — FENTANYL CITRATE 50 UG/ML
INJECTION, SOLUTION INTRAMUSCULAR; INTRAVENOUS AS NEEDED
Status: DISCONTINUED | OUTPATIENT
Start: 2022-05-27 | End: 2022-05-27 | Stop reason: HOSPADM

## 2022-05-27 RX ORDER — SODIUM CHLORIDE 0.9 % (FLUSH) 0.9 %
5-40 SYRINGE (ML) INJECTION AS NEEDED
Status: DISCONTINUED | OUTPATIENT
Start: 2022-05-27 | End: 2022-05-28 | Stop reason: HOSPADM

## 2022-05-27 RX ORDER — SODIUM CHLORIDE 0.9 % (FLUSH) 0.9 %
5-40 SYRINGE (ML) INJECTION EVERY 8 HOURS
Status: DISCONTINUED | OUTPATIENT
Start: 2022-05-27 | End: 2022-05-28 | Stop reason: HOSPADM

## 2022-05-27 RX ORDER — OXYTOCIN/RINGER'S LACTATE 30/500 ML
PLASTIC BAG, INJECTION (ML) INTRAVENOUS
Status: DISPENSED
Start: 2022-05-27 | End: 2022-05-28

## 2022-05-27 RX ORDER — MISOPROSTOL 200 UG/1
50 TABLET ORAL ONCE
Status: DISPENSED | OUTPATIENT
Start: 2022-05-27 | End: 2022-05-28

## 2022-05-27 RX ORDER — TERBUTALINE SULFATE 1 MG/ML
0.25 INJECTION SUBCUTANEOUS AS NEEDED
Status: DISCONTINUED | OUTPATIENT
Start: 2022-05-27 | End: 2022-05-28 | Stop reason: HOSPADM

## 2022-05-27 RX ORDER — BUPIVACAINE HYDROCHLORIDE 2.5 MG/ML
INJECTION, SOLUTION EPIDURAL; INFILTRATION; INTRACAUDAL
Status: COMPLETED
Start: 2022-05-27 | End: 2022-05-27

## 2022-05-27 RX ORDER — NORETHINDRONE AND ETHINYL ESTRADIOL 0.5-0.035
10 KIT ORAL ONCE
Status: DISCONTINUED | OUTPATIENT
Start: 2022-05-27 | End: 2022-05-28 | Stop reason: HOSPADM

## 2022-05-27 RX ORDER — NALOXONE HYDROCHLORIDE 0.4 MG/ML
0.4 INJECTION, SOLUTION INTRAMUSCULAR; INTRAVENOUS; SUBCUTANEOUS AS NEEDED
Status: DISCONTINUED | OUTPATIENT
Start: 2022-05-27 | End: 2022-05-28 | Stop reason: HOSPADM

## 2022-05-27 RX ORDER — OXYTOCIN/RINGER'S LACTATE 30/500 ML
10 PLASTIC BAG, INJECTION (ML) INTRAVENOUS AS NEEDED
Status: CANCELLED | OUTPATIENT
Start: 2022-05-27

## 2022-05-27 RX ORDER — FENTANYL CITRATE 50 UG/ML
100 INJECTION, SOLUTION INTRAMUSCULAR; INTRAVENOUS ONCE
Status: DISCONTINUED | OUTPATIENT
Start: 2022-05-27 | End: 2022-05-28 | Stop reason: HOSPADM

## 2022-05-27 RX ORDER — FENTANYL CITRATE 50 UG/ML
INJECTION, SOLUTION INTRAMUSCULAR; INTRAVENOUS
Status: COMPLETED
Start: 2022-05-27 | End: 2022-05-27

## 2022-05-27 RX ORDER — FENTANYL CITRATE 50 UG/ML
100 INJECTION, SOLUTION INTRAMUSCULAR; INTRAVENOUS
Status: DISCONTINUED | OUTPATIENT
Start: 2022-05-27 | End: 2022-05-28 | Stop reason: HOSPADM

## 2022-05-27 RX ORDER — BUPIVACAINE HYDROCHLORIDE 2.5 MG/ML
INJECTION, SOLUTION EPIDURAL; INFILTRATION; INTRACAUDAL AS NEEDED
Status: DISCONTINUED | OUTPATIENT
Start: 2022-05-27 | End: 2022-05-27 | Stop reason: HOSPADM

## 2022-05-27 RX ORDER — OXYTOCIN/RINGER'S LACTATE 30/500 ML
87.3 PLASTIC BAG, INJECTION (ML) INTRAVENOUS AS NEEDED
Status: CANCELLED | OUTPATIENT
Start: 2022-05-27

## 2022-05-27 RX ORDER — NALBUPHINE HYDROCHLORIDE 20 MG/ML
10 INJECTION, SOLUTION INTRAMUSCULAR; INTRAVENOUS; SUBCUTANEOUS
Status: DISCONTINUED | OUTPATIENT
Start: 2022-05-27 | End: 2022-05-28 | Stop reason: HOSPADM

## 2022-05-27 RX ADMIN — Medication 0.5 ML: at 20:57

## 2022-05-27 RX ADMIN — FENTANYL CITRATE 100 MCG: 50 INJECTION, SOLUTION INTRAMUSCULAR; INTRAVENOUS at 20:53

## 2022-05-27 RX ADMIN — Medication 4.5 ML: at 20:51

## 2022-05-27 RX ADMIN — BUPIVACAINE HYDROCHLORIDE 5 ML: 2.5 INJECTION, SOLUTION EPIDURAL; INFILTRATION; INTRACAUDAL; PERINEURAL at 20:55

## 2022-05-27 RX ADMIN — Medication 10 ML/HR: at 21:02

## 2022-05-27 NOTE — PATIENT INSTRUCTIONS
Week 40 of Your Pregnancy: Care Instructions  Overview     By week 36, you have reached your due date. Your baby could be coming any day. But it's a good idea to think ahead to the next few weeks and what might happen. If this is your first time having a baby, try not to worry. If you don't start labor on your own by 41 or 42 weeks, your doctor may recommend giving you medicines to start labor. Talk to your doctor or midwife about breathing exercises you can do if you start to feel anxious or if you're trying to relax. Follow-up care is a key part of your treatment and safety. Be sure to make and go to all appointments, and call your doctor if you are having problems. It's also a good idea to know your test results and keep a list of the medicines you take. How can you care for yourself at home? Learn how labor can be started  · If you and your baby are both healthy and ready, and if your cervix has started to open, your doctor may \"break your water\" (rupture the amniotic sac). This often starts labor. · If your cervix is not quite ready, you may get a medicine called Pitocin through an IV to start contractions. · If your cervix is still very firm, you may have prostaglandin tablets (misoprostol) placed in your vagina to soften the cervix. Try guided imagery to help you relax  · Find a comfortable place to sit or lie down. Close your eyes. · Start by just taking a few deep breaths to help you relax. · Picture a setting that is calm and peaceful. This could be a beach, a mountain setting, a meadow, or a scene that you choose. · Imagine your scene, and try to add some detail. For example, is there a breeze? What does the gia look like? Is it clear, or are there clouds? · It often helps to add a path to your scene. For example, as you enter the meadow, imagine a path leading you through the meadow to the trees on the other side.  As you follow the path farther into the meadow you feel more and more relaxed. · When you are deep into your scene and are feeling relaxed, take a few minutes to breathe slowly and feel the calm. · When you are ready, slowly take yourself out of the scene back to the present. Tell yourself that you will feel relaxed and refreshed and will bring that sense of calm with you. · Count to 3, and open your eyes. Where can you learn more? Go to http://www.gray.com/  Enter T922 in the search box to learn more about \"Week 40 of Your Pregnancy: Care Instructions. \"  Current as of: June 16, 2021               Content Version: 13.2  © 1677-7266 [employer]. Care instructions adapted under license by [employer] (which disclaims liability or warranty for this information). If you have questions about a medical condition or this instruction, always ask your healthcare professional. [place] [address] any warranty or liability for your use of this information.

## 2022-05-27 NOTE — PROGRESS NOTES
1530: Bedside and Verbal shift change report given to EASTON Hart RN (oncoming nurse) by EASTON Christina RN (offgoing nurse). Report included the following information SBAR, Intake/Output and MAR.

## 2022-05-27 NOTE — PROGRESS NOTES
Labor check. Some contractions but nothing regular yet. PROM/ SROM 0430 clear       NST procedure note    Marilynn Lloyd is a ,  32 y.o. female WHITE/NON- whose LMP  was on  who presents for fetal non-stress test.    She is 40w0d and was monitored for 20 plus minutes and the FHR was reactive. NST Interpretation:    FHR baseline 140 bpm, variability moderate, accelerations present, decelerations Absent. Uterine contractions were absent. Cristal Brooks was informed of the NST results and her questions were answered.     Disposition:    walk, eat go to L&D any time now butno later than 4 pm

## 2022-05-27 NOTE — TELEPHONE ENCOUNTER
Pt calling 40w0d who was seen in the office today c/o gushing fluid. She states she is not having contractions and she has positive fetal movement. I spoke with Thomas and she said per Dyann Lesch pt needs to report to L&D now. Pt verbalized understanding.

## 2022-05-27 NOTE — PROGRESS NOTES
1400: Bedside shift change report given to MAIA Mora RN (oncoming nurse) by Valon Lasers. Betsy Katz RN (offgoing nurse). Report included the following information SBAR, Intake/Output and MAR.

## 2022-05-27 NOTE — TELEPHONE ENCOUNTER
Call received at 8:30am      32year old  36 w0d pregnant last seen in the office yesterday    Patient spoke to on call CNM this am , patient was advised to call the office back    Patient repots water broke at 4:15am and she continues to leak, some blood in the fluid, sporadic contractions ( nothing consistent) and is feeling the baby move      Patient was advised to head to the office to be checked    Patient was placed on the schedule to be seen , ok per CC    Patient verbalized understanding.

## 2022-05-27 NOTE — H&P
History & Physical    Name: Shannan Marie MRN: [id number]  SSN: [id number]    YOB: 1995  Age: 32 y.o. Sex: female        Subjective:     Estimated Date of Delivery: 22  OB History        3    Para   1    Term   1            AB   1    Living   1       SAB   1    IAB        Ectopic        Molar        Multiple        Live Births   1                Ms. Elenita Richardson is admitted with pregnancy at 40w0d for Prom at 0430 clear fluid. Prenatal course was normal. Please see prenatal records for details. Patient Active Problem List    Diagnosis    Pregnancy     Provider: MICHELLE    NURA by: 22  Pertinents:  Consider BPPs had  COVID in January in pregnancy - fetal growth at 34 weeks 90%  Transfer from [facility]- still unsure if her  will be present for her birth, mother plans to be there but is also her grandmother's medical power of  and she is unwell - [address] [place] [address] 14    Had a bad experience with her first birth- does not want to be in the bed, keep her moving, happy to have a Hep Lock would prefer intermittent monitoring - epidural with her first baby at 7 cm-   Rh Neg  IOB labs: normal  Genetic Screening:  Anatomy: [address] on initial christa scan, resolved @ 28 wks  GTT: 3/15; 136  Flu:  TDAP: Declined  Rhogam: Aneg, Rhogam 3/15  Third Tri Labs: 11.2/34.8 - normal  GBS: Negative  COVID:  +    Pain mgmt. in labor: Desires NCB  Feeding:  Circ:  Social:    is -still in [place] he gets here in time for birth. Has birth plan. Experienced trauma with last birth-daughter went to NICU for meconium aspiration. It's a boy! \"Mango\"         No specialty comments available.   Past Medical History:   Diagnosis Date    Lyme disease      Past Surgical History:   Procedure Laterality Date    HX WISDOM TEETH EXTRACTION       Social History     Occupational History    Not on file   Tobacco Use    Smoking status: Never Smoker    Smokeless tobacco: Never Used   Substance and Sexual Activity    Alcohol use: No    Drug use: No    Sexual activity: Yes     Partners: Male     Family History   Problem Relation Age of Onset    No Known Problems Mother     No Known Problems Father        Allergies   Allergen Reactions    Latex Hives    [place] Hives     Prior to Admission medications    Medication Sig Start Date End Date Taking? Authorizing Provider   prenatal vit no.124/iron/folic (PRENATAL VITAMIN PO) Take  by mouth. Yes Provider, Historical        Review of Systems: A comprehensive review of systems was negative except for that written in the HPI. Objective:     Vitals:  Vitals:    22 1309 22 1444 22 1515   BP: 115/71  105/61   Pulse: 90  83   Resp: 16  15   Temp: 97.9 °F (36.6 °C)  98.4 °F (36.9 °C)   SpO2: 99%     Weight:  190 lb (86.2 kg)    Height:  5' 5\" (1.651 m)         Physical Exam:  Patient without distress.   Heart: Regular rate   Lung: normal respiratory effort  Abdomen: soft, nontender  Fundus: soft and non tender  Lower Extremities: neg  Membranes:  Intact  Fetal Heart Rate: Reactive    Prenatal Labs:   Lab Results   Component Value Date/Time    GrBStrep, External Negative 2022 12:00 AM    HIV, External non-reactive 03/15/2022 12:00 AM        Assessment/Plan:   31 yo  SIUP @ 40 weeks   PROM @ 0430   No labor   Pt desires admission as she is continually leaking fluid   Does not desires augmentation at this time, feels like contractions have started to increase as of 1500     Plan:   Reviewed cytotec / augmentation  Offered active motion as alternative - pt may be off monitor for one hour for intermittent monitoring   If not actively in labor by 8 pm consider cytotec 50 mcg buccally     Signed By:  Laverne Mary CNM     May 27, 2022

## 2022-05-27 NOTE — PROGRESS NOTES
1306-arrived from home, ROM at 0400, clear fluid    1350-KRISTINA Crane CNM called, she will be over to see the pt about 1600, she had previously spoke to the pt about the need for cytotec, pt prefers to wait to talk to KRISTINA Crane CNM when she gets here before taking the cytotec, notified KRISTINA Crane CNM

## 2022-05-28 PROBLEM — O42.90 AMNIOTIC FLUID LEAKING: Status: ACTIVE | Noted: 2022-05-28

## 2022-05-28 PROBLEM — Z3A.39 39 WEEKS GESTATION OF PREGNANCY: Status: ACTIVE | Noted: 2022-05-28

## 2022-05-28 PROCEDURE — 74011250637 HC RX REV CODE- 250/637: Performed by: ADVANCED PRACTICE MIDWIFE

## 2022-05-28 PROCEDURE — 65410000002 HC RM PRIVATE OB

## 2022-05-28 RX ORDER — SODIUM CHLORIDE 0.9 % (FLUSH) 0.9 %
5-40 SYRINGE (ML) INJECTION AS NEEDED
Status: DISCONTINUED | OUTPATIENT
Start: 2022-05-28 | End: 2022-05-30 | Stop reason: HOSPADM

## 2022-05-28 RX ORDER — OXYTOCIN/RINGER'S LACTATE 30/500 ML
87.3 PLASTIC BAG, INJECTION (ML) INTRAVENOUS AS NEEDED
Status: DISCONTINUED | OUTPATIENT
Start: 2022-05-28 | End: 2022-05-30 | Stop reason: HOSPADM

## 2022-05-28 RX ORDER — SODIUM CHLORIDE 0.9 % (FLUSH) 0.9 %
5-40 SYRINGE (ML) INJECTION EVERY 8 HOURS
Status: DISCONTINUED | OUTPATIENT
Start: 2022-05-28 | End: 2022-05-28

## 2022-05-28 RX ORDER — IBUPROFEN 400 MG/1
800 TABLET ORAL EVERY 8 HOURS
Status: DISCONTINUED | OUTPATIENT
Start: 2022-05-28 | End: 2022-05-30 | Stop reason: HOSPADM

## 2022-05-28 RX ORDER — DIPHENHYDRAMINE HCL 25 MG
25 CAPSULE ORAL
Status: DISCONTINUED | OUTPATIENT
Start: 2022-05-28 | End: 2022-05-30 | Stop reason: HOSPADM

## 2022-05-28 RX ORDER — LANOLIN ALCOHOL/MO/W.PET/CERES
3 CREAM (GRAM) TOPICAL
Status: DISCONTINUED | OUTPATIENT
Start: 2022-05-28 | End: 2022-05-30 | Stop reason: HOSPADM

## 2022-05-28 RX ORDER — AMMONIA 15 % (W/V)
1 AMPUL (EA) INHALATION AS NEEDED
Status: DISCONTINUED | OUTPATIENT
Start: 2022-05-28 | End: 2022-05-30 | Stop reason: HOSPADM

## 2022-05-28 RX ORDER — HYDROCORTISONE ACETATE PRAMOXINE HCL 2.5; 1 G/100G; G/100G
CREAM TOPICAL AS NEEDED
Status: DISCONTINUED | OUTPATIENT
Start: 2022-05-28 | End: 2022-05-30 | Stop reason: HOSPADM

## 2022-05-28 RX ORDER — DOCUSATE SODIUM 100 MG/1
100 CAPSULE, LIQUID FILLED ORAL
Status: DISCONTINUED | OUTPATIENT
Start: 2022-05-28 | End: 2022-05-30 | Stop reason: HOSPADM

## 2022-05-28 RX ORDER — ACETAMINOPHEN 325 MG/1
650 TABLET ORAL
Status: DISCONTINUED | OUTPATIENT
Start: 2022-05-28 | End: 2022-05-30 | Stop reason: HOSPADM

## 2022-05-28 RX ORDER — HYDROCODONE BITARTRATE AND ACETAMINOPHEN 5; 325 MG/1; MG/1
1 TABLET ORAL
Status: DISCONTINUED | OUTPATIENT
Start: 2022-05-28 | End: 2022-05-30 | Stop reason: HOSPADM

## 2022-05-28 RX ORDER — POLYETHYLENE GLYCOL 3350 17 G/17G
17 POWDER, FOR SOLUTION ORAL DAILY PRN
Status: DISCONTINUED | OUTPATIENT
Start: 2022-05-28 | End: 2022-05-30 | Stop reason: HOSPADM

## 2022-05-28 RX ORDER — HYDROCORTISONE 1 %
CREAM (GRAM) TOPICAL AS NEEDED
Status: DISCONTINUED | OUTPATIENT
Start: 2022-05-28 | End: 2022-05-30 | Stop reason: HOSPADM

## 2022-05-28 RX ORDER — OXYTOCIN/RINGER'S LACTATE 30/500 ML
10 PLASTIC BAG, INJECTION (ML) INTRAVENOUS AS NEEDED
Status: DISCONTINUED | OUTPATIENT
Start: 2022-05-28 | End: 2022-05-30 | Stop reason: HOSPADM

## 2022-05-28 RX ORDER — SIMETHICONE 80 MG
80 TABLET,CHEWABLE ORAL
Status: DISCONTINUED | OUTPATIENT
Start: 2022-05-28 | End: 2022-05-30 | Stop reason: HOSPADM

## 2022-05-28 RX ORDER — ENOXAPARIN SODIUM 100 MG/ML
40 INJECTION SUBCUTANEOUS DAILY
Status: DISCONTINUED | OUTPATIENT
Start: 2022-05-28 | End: 2022-05-28

## 2022-05-28 RX ORDER — HYDROCODONE BITARTRATE AND ACETAMINOPHEN 7.5; 325 MG/1; MG/1
1 TABLET ORAL
Status: DISCONTINUED | OUTPATIENT
Start: 2022-05-28 | End: 2022-05-30 | Stop reason: HOSPADM

## 2022-05-28 RX ORDER — ONDANSETRON 4 MG/1
4 TABLET, ORALLY DISINTEGRATING ORAL
Status: DISCONTINUED | OUTPATIENT
Start: 2022-05-28 | End: 2022-05-30 | Stop reason: HOSPADM

## 2022-05-28 RX ORDER — ONDANSETRON 2 MG/ML
4 INJECTION INTRAMUSCULAR; INTRAVENOUS
Status: DISCONTINUED | OUTPATIENT
Start: 2022-05-28 | End: 2022-05-30 | Stop reason: HOSPADM

## 2022-05-28 RX ORDER — ACETAMINOPHEN 650 MG/1
650 SUPPOSITORY RECTAL
Status: DISCONTINUED | OUTPATIENT
Start: 2022-05-28 | End: 2022-05-30 | Stop reason: HOSPADM

## 2022-05-28 RX ADMIN — IBUPROFEN 800 MG: 400 TABLET, FILM COATED ORAL at 10:03

## 2022-05-28 RX ADMIN — IBUPROFEN 800 MG: 400 TABLET, FILM COATED ORAL at 00:43

## 2022-05-28 RX ADMIN — IBUPROFEN 800 MG: 400 TABLET, FILM COATED ORAL at 18:14

## 2022-05-28 NOTE — ANESTHESIA PROCEDURE NOTES
Epidural Block    Patient location during procedure: OB  Reason for block: labor epidural  Staffing  Performed: attending   Preanesthetic Checklist  Completed: patient identified, IV checked, site marked, risks and benefits discussed, surgical consent, monitors and equipment checked, pre-op evaluation and timeout performed  Block Placement  Patient position: left lateral decubitus  Prep: DuraPrep  Sterility prep: cap, drape, gloves, mask and hand  Sedation level: no sedation  Patient monitoring: continuous pulse oximetry and heart rate  Approach: midline  Location: lumbar  Lumbar location: L3-L4  Epidural  Loss of resistance technique: air and saline  Guidance: landmark technique  Needle  Needle type: Tuohy   Needle gauge: 17 G  Needle length: 9 cm  Needle insertion depth: 5 cm  Catheter type: end hole  Catheter size: 19 G  Catheter at skin depth: 10 cm  Catheter securement method: clear occlusive dressing and surgical tape  Test dose: negative  Medications Administered  Lidocaine-EPINEPHrine (XYLOCAINE) 1.5 %-1:200,000 Epidural, 0.5 mL  Assessment  Sensory level: T10  Block outcome: pain improved  Number of attempts: 1  Procedure assessment: patient tolerated procedure well with no immediate complications

## 2022-05-28 NOTE — PROGRESS NOTES
Postpartum Note  S/P , PPday #1  Ambulating and voiding without diff  Baldomero po and po meds well  Breastfeeding well established    Unable to find Hep B lab in transfer records- due to holiday weekend pt consents to having any labs needed redrawn    Discussed and reviewed circ- pt is undecided - declines Vit K at this time - understands that if she wants Circ we will not do circ with out-  Emerson Letters maribell's info to review prior to making decisions    O: VSS, Afebrile       Patient Vitals for the past 24 hrs:   Temp Pulse Resp BP SpO2   22 0500 98 °F (36.7 °C) 62 16 116/71 97 %   22 0329 98 °F (36.7 °C) 60 16 123/75 96 %   22 0040 98.2 °F (36.8 °C) 78 16 118/70 95 %   22 0001 -- 91 -- 118/69 --   22 2334 -- 85 -- 106/65 --   22 2305 -- 98 -- 115/67 --   22 2249 -- 96 -- 102/64 --   224 -- (!) 104 -- 108/62 --   22 -- 98 -- (!) 110/56 96 %   22 -- (!) 102 -- 116/68 --   22 97.6 °F (36.4 °C) (!) 105 -- 116/68 98 %   22 -- -- -- 108/63 --   22 -- (!) 108 -- 134/82 --   22 1515 98.4 °F (36.9 °C) 83 15 105/61 --   22 1309 97.9 °F (36.6 °C) 90 16 115/71 99 %           Breasts soft, NT        FF @ U-1 ML, Lochia Small Rubra        Perineum Intact        Ext NT, No REP, Neg Reggie's    A/P: Routine PP care          Maternal Education          Lactation consultation prn          Plan Discharge in am    Anneliese Dejesus CNM

## 2022-05-28 NOTE — PROGRESS NOTES
CNM in room with pt - pt in tub- working hard with contractions    2015- pt assisted out of tub to bed requesting SVE 3-4/80/high    Pt assisted back to bed, asking for epidural shorthly there after    CNM in room until Dr. Ana Burns arrived for epidural.       Bridger Torres, MICHELLE

## 2022-05-28 NOTE — LACTATION NOTE
This note was copied from a baby's chart. Initial Lactation Consultation: Infant born vaginally yesterday evening to a  mom at 43 weeks gestation. Mom nursed her first child for 1 year with adequate milk supply. Observed this infant at breast; deep latch noted with occasional swallows noted. Feeding Plan: Mother will keep baby skin to skin as often as possible, feed on demand, 8-12x/day , respond to feeding cues, obtain latch, listen for audible swallowing, be aware of signs of oxytocin release/ cramping,thirst,sleepiness while breastfeeding, offer both breasts,and will not limit feedings. Mother agrees to utilize breast massage while nursing to facilitate lactogenesis.

## 2022-05-28 NOTE — PROGRESS NOTES
Bedside and Verbal shift change report given to ANA MARIA Morales RN (oncoming nurse) by Yennifer Herrera RN (offgoing nurse). Report included the following information SBAR, Procedure Summary, Intake/Output, MAR and Recent Results.

## 2022-05-28 NOTE — L&D DELIVERY NOTE
Delivery Summary  Became Complete and +1, with strong urge to push. Pushed for 10 mins.  Live Male infant. Over intact perineum. Infant placed to maternal abdomen. Infant dried and stimulated, spontaneous cry. Cord allowed to cease pulsations, then doubly clamped and cut per FOB. 3VC. Cordblood Obtained yes. Placenta and cord, spont pushpa, Intact. QBL- minimal quantifiable bleeding with birth and placenta-  . All counts correct yes. To RR, Stable. Patient: Rubin Benton MRN: [id number]  SSN: [id number]    YOB: 1995  Age: 32 y.o. Sex: female       Information for the patient's :  Yaya Jenkins [[id number]]       Labor Events:    Labor: No    Steroids: None   Cervical Ripening Date/Time:       Cervical Ripening Type:     Antibiotics During Labor:     Rupture Identifier:      Rupture Date/Time:       Rupture Type:     Amniotic Fluid Volume:      Amniotic Fluid Description:      Amniotic Fluid Odor:      Induction:         Induction Date/Time:        Indications for Induction:      Augmentation:     Augmentation Date/Time:      Indications for Augmentation:     Labor complications:          Additional complications:        Delivery Events:  Indications For Episiotomy:     Episiotomy: None   Perineal Laceration(s): 1st   Repaired:     Periurethral Laceration Location:      Repaired:     Labial Laceration Location:     Repaired:     Sulcal Laceration Location:     Repaired:     Vaginal Laceration Location:     Repaired:     Cervical Laceration Location:     Repaired:     Repair Suture: Vicryl 3-0   Number of Repair Packets: 1   Estimated Blood Loss (ml):  ml   Quantitative Blood Loss (ml)                Delivery Date: 2022    Delivery Time: 9:59 PM  Delivery Type: Vaginal, Spontaneous  Sex:  Male    Gestational Age: 37w0d   Delivery Clinician:  Janey Barker  Living Status: Living   Delivery Location: L&D            APGARS  One minute Five minutes Ten minutes   Skin color: 1   1        Heart rate: 2   2        Grimace: 2   2        Muscle tone: 2   2        Breathin   2        Totals: 9   9            Presentation: Vertex    Position:   Occiput Anterior  Resuscitation Method:  Suctioning-bulb; Tactile Stimulation     Meconium Stained: Terminal      Cord Information: 3 Vessels  Complications: Nuchal Cord With Compressions;Nuchal Cord Without Compressions  Cord around: head  Delayed cord clamping? Yes  Cord clamped date/time:2022 10:07 PM  Disposition of Cord Blood: Lab    Blood Gases Sent?: No    Placenta:  Date/Time: 2022 10:08 PM  Removal: Spontaneous      Appearance: Normal     [place] Measurements:  Birth Weight:        Birth Length:        Head Circumference:        Chest Circumference:       Abdominal Girth: Other Providers:   ;CARLOS Banks;;;;;;MAGALYS ARAUJO, Obstetrician;Primary Nurse;Primary  Nurse;Nicu Nurse;Neonatologist;Anesthesiologist;Crna;Nurse Practitioner;Midwife;Nursery Nurse           Group B Strep:   Lab Results   Component Value Date/Time    GrBStrep, External Negative 2022 12:00 AM     Information for the patient's :  Judit Lyon [[id number]]   No results found for: ABORH, PCTABR, PCTDIG, BILI, ABORHEXT, ABORH     No results for input(s): PCO2CB, PO2CB, HCO3I, SO2I, IBD, PTEMPI, SPECTI, PHICB, ISITE, IDEV, IALLEN in the last 72 hours.

## 2022-05-28 NOTE — PROGRESS NOTES
TRANSFER - IN REPORT:    Verbal report received from Richard Maier (name) on Nick Ban  being received from L&D(unit) for routine progression of care      Report consisted of patients Situation, Background, Assessment and   Recommendations(SBAR). Information from the following report(s) SBAR was reviewed with the receiving nurse. Opportunity for questions and clarification was provided. Assessment completed upon patients arrival to unit and care assumed.

## 2022-05-29 VITALS
RESPIRATION RATE: 14 BRPM | BODY MASS INDEX: 31.65 KG/M2 | WEIGHT: 190 LBS | DIASTOLIC BLOOD PRESSURE: 64 MMHG | HEIGHT: 65 IN | SYSTOLIC BLOOD PRESSURE: 103 MMHG | OXYGEN SATURATION: 95 % | TEMPERATURE: 98.1 F | HEART RATE: 91 BPM

## 2022-05-29 LAB
HBV SURFACE AG SER QL: <0.1 INDEX
HBV SURFACE AG SER QL: NEGATIVE
RUBV IGG SER-IMP: REACTIVE
RUBV IGG SERPL IA-ACNC: >500 IU/ML

## 2022-05-29 PROCEDURE — 86762 RUBELLA ANTIBODY: CPT

## 2022-05-29 PROCEDURE — 87340 HEPATITIS B SURFACE AG IA: CPT

## 2022-05-29 PROCEDURE — 74011250637 HC RX REV CODE- 250/637: Performed by: ADVANCED PRACTICE MIDWIFE

## 2022-05-29 PROCEDURE — 36415 COLL VENOUS BLD VENIPUNCTURE: CPT

## 2022-05-29 PROCEDURE — 65410000002 HC RM PRIVATE OB

## 2022-05-29 RX ORDER — IBUPROFEN 800 MG/1
800 TABLET ORAL EVERY 8 HOURS
Qty: 90 TABLET | Refills: 0 | Status: SHIPPED | OUTPATIENT
Start: 2022-05-29

## 2022-05-29 RX ADMIN — IBUPROFEN 800 MG: 400 TABLET, FILM COATED ORAL at 02:36

## 2022-05-29 RX ADMIN — IBUPROFEN 800 MG: 400 TABLET, FILM COATED ORAL at 14:51

## 2022-05-29 NOTE — DISCHARGE SUMMARY
Obstetrical Discharge Summary     Name: Kayden Brown MRN: [id number]  SSN: [id number]    YOB: 1995  Age: 32 y.o. Sex: female      Allergies: Latex and Talat    Admit Date: 2022    Discharge Date: 2022     Admitting Physician: Antonio Delgado CNM     Attending Physician:  Maureen Singletary*     * Admission Diagnoses: Amniotic fluid leaking [O42.90]  39 weeks gestation of pregnancy [Z3A.39]    * Discharge Diagnoses:   Information for the patient's :  Birdie Hines [[id number]]   Delivery of a 8 lb 11.3 oz (3.95 kg) male infant via Vaginal, Spontaneous on 2022 at 9:59 PM  by Antonio Delgado. Apgars were 9  and 9 . Additional Diagnoses:   Hospital Problems as of 2022 Date Reviewed: 2022          Codes Class Noted - Resolved POA    Amniotic fluid leaking ICD-10-CM: O42.90  ICD-9-CM: 658.10  2022 - Present Unknown        39 weeks gestation of pregnancy ICD-10-CM: Z3A.39  ICD-9-CM: V22.2  2022 - Present Unknown             Lab Results   Component Value Date/Time    GrBStrep, External Negative 2022 12:00 AM    ABO,Rh A Negative 2022 12:00 AM    There is no immunization history for the selected administration types on file for this patient. * Procedures:   * No surgery found *           * Discharge Condition: good    [facility] Course: Normal hospital course following the delivery. * Disposition: Home    Discharge Medications:   Current Discharge Medication List          * Follow-up Care/Patient Instructions:   Activity: Activity as tolerated  Diet: Regular Diet  Wound Care: None needed    Follow-up Information    None            Antonio Delgado CNM

## 2022-05-29 NOTE — LACTATION NOTE
This note was copied from a baby's chart. Infant with bili in HR zone and started on phototherapy. At the time of my visit, mom attempting to latch infant. He is fussy at breast and wont latch intially; with sweet ease, in football hold, he latched and nursed for 5 minutes then fell asleep. Mom has lots of easily expressed colostrum. Provided mom with instructions for pump set up, use and cleaning. She will pump at least every 3 hours following nursing sessions to further stimulate production. Demonstrated syringe feeding to mom.

## 2022-05-29 NOTE — PROGRESS NOTES
Postpartum Note    S/P , PPD#2  Ambulating and Voiding without diff  Baldomero po and po meds well  Breastfeeding well established  Desires discharge home    Declines circumcision - info for ped urologist Giuseppe Bellamy given and reviewed    O:  A,A&O x 3        VSS, Afebrile       Patient Vitals for the past 24 hrs:   Temp Pulse Resp BP SpO2   22 0530 97.7 °F (36.5 °C) 70 14 111/71 97 %   22 2124 97.6 °F (36.4 °C) 86 16 (!) 102/56 96 %   22 1800 98.5 °F (36.9 °C) 78 14 111/76 97 %          Breasts soft, NT       Abd soft, NT/ND       FF@ U-1, ML       Scant Lochia Rubra       Perineum Intact       Ext without REP, Neg Reggie's    A/P: Routine PP care          Discharge home in stable cond.           RTO 6 wks for PP exam, sooner prn         Roberta Prater CNM

## 2022-05-29 NOTE — DISCHARGE INSTRUCTIONS
Patient Education        After Your Delivery (the Postpartum Period): Care Instructions  Overview     Congratulations on the birth of your baby. Like pregnancy, the  period can be a time of excitement, gemma, and exhaustion. You may look at your wondrous little baby and feel happy. You may also be overwhelmed by your new sleep hours and new responsibilities. At first, babies often sleep during the days and are awake at night. They do not have a pattern or routine. They may make sudden gasps, jerk themselves awake, or look like they have crossed eyes. These are all normal, and they may even make you smile. In these first weeks after delivery, try to take good care of yourself. It may take 4 to 6 weeks to feel like yourself again, and possibly longer if you had a  birth. You will likely feel very tired for several weeks. Your days will be full of ups and downs, but lots of gemma as well. Follow-up care is a key part of your treatment and safety. Be sure to make and go to all appointments, and call your doctor if you are having problems. It's also a good idea to know your test results and keep a list of the medicines you take. How can you care for yourself at home? Take care of your body after delivery  · Use pads instead of tampons for the bloody flow that may last as long as 2 weeks. · Ease cramps with ibuprofen (Advil, Motrin). · Ease soreness of hemorrhoids and the area between your vagina and rectum with ice compresses or witch hazel pads. · Ease constipation by drinking lots of fluid and eating high-fiber foods. Ask your doctor about over-the-counter stool softeners. · Cleanse yourself with a gentle squeeze of warm water from a bottle instead of wiping with toilet paper. · Take a sitz bath in warm water several times a day. · Wear a good nursing bra. Ease sore and swollen breasts with warm, wet washcloths.   · If you aren't breastfeeding, use ice rather than heat for breast soreness. · Your period may not start for several months if you are breastfeeding. You may bleed more, and longer at first, than you did before you got pregnant. · Wait until you are healed (about 4 to 6 weeks) before you have sex. Ask your doctor when it is okay for you to have sex. · Try not to travel with your baby for 5 or 6 weeks. If you take a long car trip, make frequent stops to walk around and stretch. Avoid exhaustion  · Rest every day. Try to nap when your baby naps. · Ask another adult to be with you for a few days after delivery. · Plan for  if you have other children. · Stay flexible so you can eat at odd hours and sleep when you need to. Both you and your baby are making new schedules. · Plan small trips to get out of the house. Change can make you feel less tired. · Ask for help with housework, cooking, and shopping. Remind yourself that your job is to care for your baby. Know about help for postpartum depression  · \"Baby blues\" are common for the first 1 to 2 weeks after birth. You may cry or feel sad or irritable for no reason. · Rest whenever you can. Being tired makes it harder to handle your emotions. · Go for walks with your baby. · Talk to your partner, friends, and family about your feelings. · If your symptoms last for more than a few weeks, or if you feel very depressed, ask your doctor for help. · Postpartum depression can be treated. Support groups and counseling can help. Sometimes medicine can also help. Stay healthy  · Eat healthy foods so you have more energy. · If you breastfeed, avoid drugs. If you quit smoking during pregnancy, try to stay smoke-free. If you choose to have a drink now and then, have only one drink, and limit the number of occasions that you have a drink. Wait to breastfeed at least 2 hours after you have a drink to reduce the amount of alcohol the baby may get in the milk.   · Start daily exercise after 4 to 6 weeks, but rest when you feel tired.  · Learn exercises to tone your belly. Do Kegel exercises to regain strength in your pelvic muscles. You can do these exercises while you stand or sit. ? Squeeze the same muscles you would use to stop your urine. Your belly and thighs should not move. ? Hold the squeeze for 3 seconds, and then relax for 3 seconds. ? Start with 3 seconds. Then add 1 second each week until you are able to squeeze for 10 seconds. ? Repeat the exercise 10 to 15 times for each session. Do three or more sessions each day. · Find a class for you and your baby that has an exercise time. · If you had a  birth, give yourself a bit more time before you exercise, and be careful. When should you call for help? Share this information with your partner, family, or a friend. They can help you watch for warning signs. Call 911  anytime you think you may need emergency care. For example, call if:    · You have thoughts of harming yourself, your baby, or another person.     · You passed out (lost consciousness).     · You have chest pain, are short of breath, or cough up blood.     · You have a seizure. Call your doctor now or seek immediate medical care if:    · You have signs of hemorrhage (too much bleeding), such as:  ? Heavy vaginal bleeding. This means that you are soaking through one or more pads in an hour. Or you pass blood clots bigger than an egg. ? Feeling dizzy or lightheaded, or you feel like you may faint. ? Feeling so tired or weak that you cannot do your usual activities. ? A fast or irregular heartbeat. ? New or worse belly pain.     · You have signs of infection, such as:  ? A fever. ? Vaginal discharge that smells bad.  ? New or worse belly pain.     · You have symptoms of a blood clot in your leg (called a deep vein thrombosis), such as:  ? Pain in the calf, back of the knee, thigh, or groin. ?  Redness and swelling in your leg or groin.     · You have signs of preeclampsia, such as:  ? Sudden swelling of your face, hands, or feet. ? New vision problems (such as dimness, blurring, or seeing spots). ? A severe headache. Watch closely for changes in your health, and be sure to contact your doctor if:    · Your vaginal bleeding isn't decreasing.     · You feel sad, anxious, or hopeless for more than a few days.     · You are having problems with your breasts or breastfeeding. Where can you learn more? Go to http://www.[employer].com/  Enter A461 in the search box to learn more about \"After Your Delivery (the Postpartum Period): Care Instructions. \"  Current as of: June 16, 2021               Content Version: 13.2  © 2006-2022 [employer]. Care instructions adapted under license by [employer] (which disclaims liability or warranty for this information). If you have questions about a medical condition or this instruction, always ask your healthcare professional. [place] [address] any warranty or liability for your use of this information. Postpartum Support Groups (virtual)  We know that all of us are dealing with a tremendous amount of uncertainty, confusion and disruption to our daily lives, which may result in increased anxiety, depression and fear. If you are feeling unsettled or worse, please know that we are here to help. During this time of increased caution and care for one another, [facility] [place] ([address] [place] [address]) is offering virtual support groups to ALL MOTHERS in [place] regardless of the age of your child/children as a way to help weather this emotional storm together. Social support is an important part of self-care during this time of physical distancing. Virtual postpartum support group meetings available at www. postpartumva.org  Warm Line: 209.727.9235    Breastfeeding Support Groups   1st and 3rd Wednesday of each month  2nd and 4th Tuesday of each month    [place] at www.[employer] under the \"About Us\" and \"Classes and Events tabs\"

## 2022-06-07 ENCOUNTER — TELEPHONE (OUTPATIENT)
Dept: MOTHER INFANT UNIT | Age: 27
End: 2022-06-07

## 2022-08-09 ENCOUNTER — OFFICE VISIT (OUTPATIENT)
Dept: OBGYN CLINIC | Age: 27
End: 2022-08-09
Payer: MEDICAID

## 2022-08-09 VITALS
HEIGHT: 65 IN | WEIGHT: 175.8 LBS | DIASTOLIC BLOOD PRESSURE: 78 MMHG | BODY MASS INDEX: 29.29 KG/M2 | SYSTOLIC BLOOD PRESSURE: 118 MMHG

## 2022-08-09 PROCEDURE — 0503F POSTPARTUM CARE VISIT: CPT | Performed by: ADVANCED PRACTICE MIDWIFE

## 2022-08-09 NOTE — PROGRESS NOTES
Postpartum evaluation    [place] is a 32 y.o. female who presents for a postpartum exam.     She is now ten weeks post normal spontaneous vaginal delivery. Her baby boy, Bahman Juan, is doing well. She has had no menses since delivery. She has had the following significant problems since her delivery: none    The patient is breast feeding without difficulty. The patient would like to use condoms for birth control. She is currently taking: no medications.        Visit Vitals  /78   Ht 5' 5\" (1.651 m)   Wt 175 lb 12.8 oz (79.7 kg)   BMI 29.25 kg/m²       PHYSICAL EXAMINATION    Constitutional  Appearance: well-nourished, well developed, alert, in no acute distress    HENT  Head and Face: appears normal    Neck  Inspection/Palpation: normal appearance    Gastrointestinal  Abdominal Examination: abdomen non-tender to palpation, no masses present  Liver and spleen: no hepatomegaly present, spleen not palpable  Hernias: no hernias identified  Rectus muscles - 1 FB diastasis    Genitourinary  Declines    Skin  General Inspection: no rash, no lesions identified    Neurologic/Psychiatric  Mental Status:  Orientation: grossly oriented to person, place and time  Mood and Affect: mood normal, affect appropriate  EPDS -2     Assessment/Plan:  Normal postpartum check- has resumed normal pre pregnant activities  Condoms for contraception    Offered PT eval for diastasis- pt is doing a PP exercise program that worked well for her after first baby-   Recommend follow up in 3-6 months for reassessment and ae    PT will call sooner if she desires PT consult prior to for RD and PF assessment       Kym Guillermo CNM

## 2023-05-22 RX ORDER — IBUPROFEN 800 MG/1
800 TABLET ORAL EVERY 8 HOURS
COMMUNITY
Start: 2022-05-29